# Patient Record
Sex: FEMALE | Race: OTHER | HISPANIC OR LATINO | URBAN - NONMETROPOLITAN AREA
[De-identification: names, ages, dates, MRNs, and addresses within clinical notes are randomized per-mention and may not be internally consistent; named-entity substitution may affect disease eponyms.]

---

## 2021-09-07 NOTE — PATIENT DISCUSSION
The patient feels that the cataract is significantly impacting daily activities and has elected cataract surgery. The risks, benefits, and alternatives to surgery were discussed. The patient elects to proceed with surgery.  5 [facility].

## 2021-09-14 NOTE — PATIENT DISCUSSION
Per Thomas Deborah enhancement if needed after sx on top of lasik flap provided that the post-op refractive error is not very myopic.

## 2021-09-14 NOTE — PATIENT DISCUSSION
Marleen Bond will be pulled and sent to Dr. Ketan Mann to see if pt would be a candidate for Lasik after sx. Pt very interested in Custom monovision OS regina/OD near. Pt educated in length if she is not a candidate for lasik enhancement, she may still need glasses for her best corrected vision distance, intermediate and/or near even with the Custom lens. Additonally, pt educated that there is more uncertainty prior to surgery due to irregular cornea.

## 2021-09-14 NOTE — PATIENT DISCUSSION
Rick Din will be pulled and sent to Dr. Sherryle Hum to see if pt would be a candidate for Lasik after sx. Pt very interested in Custom monovision OS regina/OD near. Pt educated in length if she is not a candidate for lasik enhancement, she may still need glasses for her best corrected vision distance, intermediate and/or near even with the Custom lens. Additonally, pt educated that there is more uncertainty prior to surgery due to irregular cornea.

## 2021-09-14 NOTE — PATIENT DISCUSSION
Per Ruben Lente enhancement if needed after sx on top of lasik flap provided that the post-op refractive error is not very myopic.

## 2021-11-16 NOTE — PATIENT DISCUSSION
Per Robby Nissen enhancement if needed after sx on top of lasik flap provided that the post-op refractive error is not very myopic.

## 2021-11-16 NOTE — PATIENT DISCUSSION
Rosalia Mcginnis will be pulled and sent to Dr. Ni Ruano to see if pt would be a candidate for Lasik after sx. Pt very interested in Custom monovision OS regina/OD near. Pt educated in length if she is not a candidate for lasik enhancement, she may still need glasses for her best corrected vision distance, intermediate and/or near even with the Custom lens. Additonally, pt educated that there is more uncertainty prior to surgery due to irregular cornea.

## 2021-11-16 NOTE — PATIENT DISCUSSION
Daryle Hatchet will be pulled and sent to Dr. Steff Kohli to see if pt would be a candidate for Lasik after sx. Pt very interested in Custom monovision OS regina/OD near. Pt educated in length if she is not a candidate for lasik enhancement, she may still need glasses for her best corrected vision distance, intermediate and/or near even with the Custom lens. Additonally, pt educated that there is more uncertainty prior to surgery due to irregular cornea.

## 2021-11-30 NOTE — PATIENT DISCUSSION
Per Cj Gary enhancement if needed after sx on top of lasik flap provided that the post-op refractive error is not very myopic.

## 2021-11-30 NOTE — PATIENT DISCUSSION
Per Herberth Milo enhancement if needed after sx on top of lasik flap provided that the post-op refractive error is not very myopic.

## 2021-11-30 NOTE — PATIENT DISCUSSION
Cinthia Mckeon will be pulled and sent to Dr. Bev Hardy to see if pt would be a candidate for Lasik after sx. Pt very interested in Custom monovision OS regina/OD near. Pt educated in length if she is not a candidate for lasik enhancement, she may still need glasses for her best corrected vision distance, intermediate and/or near even with the Custom lens. Additonally, pt educated that there is more uncertainty prior to surgery due to irregular cornea.

## 2021-11-30 NOTE — PATIENT DISCUSSION
Rafat Santillan will be pulled and sent to Dr. Zari Kent to see if pt would be a candidate for Lasik after sx. Pt very interested in Custom monovision OS regina/OD near. Pt educated in length if she is not a candidate for lasik enhancement, she may still need glasses for her best corrected vision distance, intermediate and/or near even with the Custom lens. Additonally, pt educated that there is more uncertainty prior to surgery due to irregular cornea.

## 2021-11-30 NOTE — PATIENT DISCUSSION
Patient made aware of 24/7 emergency services. Reason For Visit  Reason For Visit:   Patient presents for follow-up .   Chaperone: DEMARCO AVERY is unaccompanied.   :  services not used.        Progress Note  Progress Note:   Session Type: Individual   Date: 11/29/17   Start Time: 2:30, pm   End Time: 3:35, pm   Mood: Euthymic.   Recent Behavior: No Problem Indicated.   Symptom Change: Improved.   Diagnoses: Major depressive disorder single episode with anxious distress/ PTSD   Mental Status: No Problem Indicated   Risk of Harm: Low   Suicide/Homicide: None   Self-Injury: None   Abuse (Phys,Sex,Emot): None   Type of Treatment: Cognitive Behavioral Therapy, Motivation Enhancement and Supportive Therapy.   Session Summary: Patient reported having issues in current relationship and ending relationship the day prior. Patient reported using coping skills of journaling and feeling like that has been assisting in not lashing out. Patient has been practicing breathing. Patient reported communicating clearly with partner when ending relationship. Patient explored character traits she looks for in a partner. Patient reported feeling like she has been more positive. Patient has been spending more time with sister and mother. Patient reported better sleep and not being disturbed by break up. Patient identified feelings behind break up.   Plan: Continue with weekly counseling at this time.      Active Problems   1. Gastroesophageal reflux disease without esophagitis (K21.9)   2. Numbness of arm (R20.0)   3. Pre-conception counseling (Z31.69)    Physical Exam  Psychiatric:. the patient's orientation, memory, attention, language and fund of knowledge were normal. mood and affect were appropriate. the speech was normal. the patient's thought processes were normal. associations were intact with no tangential or circumstantial thinking. no hallucinations or delusions and no homicidal or suicidal thoughts . insight and judgment were intact.       Discussion/Summary  Discussion Summary: Additional Therapeutic Support Provided:  Engaged/allied with patient  Provided support and encouragement  Provided empathy and active listening  Provided supportive feedback and recommendations    Diagnosis:  Major depressive disorder single episode with anxious distress  Post traumatic stress disorder    Recommendations/Plan:  1. I will continue to follow patient regularly to provide training in development of better coping strategies in relation to patient's issues of mood.   2. Follow up in 2 weeks.  3. Patient will go to the nearest emergency room or call 9-1-1 if patient ever reports feelings of suicidality or if they believe patient may be a threat to self or others.  4. Continue medications under care of Dr.      Time  Consult Counseling: Total face to face time spent with patient 65 minutes. Greater than 50% of the total time was spent counseling and/or coordinating care.      Signatures   Electronically signed by : LEIGHANN LEIGH LCSW; Nov 29 2017  4:21PM CST

## 2021-11-30 NOTE — PATIENT DISCUSSION
[facility]   [place] EXT (4VIR): 8677   Operative Information hydro/ureteral stone  Date of Procedure: 3/3/20  Pre-Op Diagnosis: R hydro and renal stone  Post-Op Diagnosis: same  Procedure: R perc neph   Indication: see above  : maryanne  Anesthesia: moderate  Medications: versed and fentanyl  Description of Procedure: R perc neph 8 [place]  Specimens: too lab  Complications: none  Estimated blood loss: minimal  Patient Condition: good

## 2021-11-30 NOTE — PATIENT DISCUSSION
John Giron will be pulled and sent to Dr. Efren Spring to see if pt would be a candidate for Lasik after sx. Pt very interested in Custom monovision OS regina/OD near. Pt educated in length if she is not a candidate for lasik enhancement, she may still need glasses for her best corrected vision distance, intermediate and/or near even with the Custom lens. Additonally, pt educated that there is more uncertainty prior to surgery due to irregular cornea.

## 2021-12-01 NOTE — PATIENT DISCUSSION
Per Laura Miramontes enhancement if needed after sx on top of lasik flap provided that the post-op refractive error is not very myopic.

## 2021-12-01 NOTE — PATIENT DISCUSSION
Per Raleigh Carias enhancement if needed after sx on top of lasik flap provided that the post-op refractive error is not very myopic.

## 2021-12-01 NOTE — PATIENT DISCUSSION
Raphael Nieves will be pulled and sent to Dr. Camilla White to see if pt would be a candidate for Lasik after sx. Pt very interested in Custom monovision OS regina/OD near. Pt educated in length if she is not a candidate for lasik enhancement, she may still need glasses for her best corrected vision distance, intermediate and/or near even with the Custom lens. Additonally, pt educated that there is more uncertainty prior to surgery due to irregular cornea.

## 2021-12-01 NOTE — PATIENT DISCUSSION
Karlene Michel will be pulled and sent to Dr. Devendra Cruz to see if pt would be a candidate for Lasik after sx. Pt very interested in Custom monovision OS regina/OD near. Pt educated in length if she is not a candidate for lasik enhancement, she may still need glasses for her best corrected vision distance, intermediate and/or near even with the Custom lens. Additonally, pt educated that there is more uncertainty prior to surgery due to irregular cornea.

## 2021-12-13 NOTE — PATIENT DISCUSSION
Pippa Nick will be pulled and sent to Dr. [facility] to see if pt would be a candidate for Lasik after sx. Pt very interested in Custom monovision OS regina/OD near. Pt educated in length if she is not a candidate for lasik enhancement, she may still need glasses for her best corrected vision distance, intermediate and/or near even with the Custom lens. Additonally, pt educated that there is more uncertainty prior to surgery due to irregular cornea.

## 2021-12-13 NOTE — PATIENT DISCUSSION
Anish Tobar enhancement if needed after sx on top of lasik flap provided that the post-op refractive error is not very myopic.

## 2021-12-13 NOTE — PATIENT DISCUSSION
Annelise Wild will be pulled and sent to Dr. Valentina Lee to see if pt would be a candidate for Lasik after sx. Pt very interested in Custom monovision OS regina/OD near. Pt educated in length if she is not a candidate for lasik enhancement, she may still need glasses for her best corrected vision distance, intermediate and/or near even with the Custom lens. Additonally, pt educated that there is more uncertainty prior to surgery due to irregular cornea.

## 2021-12-13 NOTE — PATIENT DISCUSSION
Per Reji Tramaine enhancement if needed after sx on top of lasik flap provided that the post-op refractive error is not very myopic.

## 2021-12-13 NOTE — PATIENT DISCUSSION
Per Abeba De La Torre enhancement if needed after sx on top of lasik flap provided that the post-op refractive error is not very myopic.

## 2021-12-13 NOTE — PATIENT DISCUSSION
Chip Books will be pulled and sent to Dr. Ada Laurent to see if pt would be a candidate for Lasik after sx. Pt very interested in Custom monovision OS regina/OD near. Pt educated in length if she is not a candidate for lasik enhancement, she may still need glasses for her best corrected vision distance, intermediate and/or near even with the Custom lens. Additonally, pt educated that there is more uncertainty prior to surgery due to irregular cornea.

## 2021-12-13 NOTE — PATIENT DISCUSSION
Krystle Mcguire will be pulled and sent to Dr. Darell Beavers to see if pt would be a candidate for Lasik after sx. Pt very interested in Custom monovision OS regina/OD near. Pt educated in length if she is not a candidate for lasik enhancement, she may still need glasses for her best corrected vision distance, intermediate and/or near even with the Custom lens. Additonally, pt educated that there is more uncertainty prior to surgery due to irregular cornea.

## 2021-12-13 NOTE — PATIENT DISCUSSION
Per Yuliana Kohli enhancement if needed after sx on top of lasik flap provided that the post-op refractive error is not very myopic.

## 2021-12-14 NOTE — PATIENT DISCUSSION
Per Mari Pino enhancement if needed after sx on top of lasik flap provided that the post-op refractive error is not very myopic.

## 2021-12-14 NOTE — PATIENT DISCUSSION
Rashaun Garcia will be pulled and sent to Dr. Sasha Godfrey to see if pt would be a candidate for Lasik after sx. Pt very interested in Custom monovision OS regina/OD near. Pt educated in length if she is not a candidate for lasik enhancement, she may still need glasses for her best corrected vision distance, intermediate and/or near even with the Custom lens. Additonally, pt educated that there is more uncertainty prior to surgery due to irregular cornea.

## 2022-01-13 NOTE — PATIENT DISCUSSION
Per Campos Fischer enhancement if needed after sx on top of lasik flap provided that the post-op refractive error is not very myopic.

## 2022-01-13 NOTE — PATIENT DISCUSSION
Soham Adams will be pulled and sent to Dr. Jose Chavira to see if pt would be a candidate for Lasik after sx. Pt very interested in Custom monovision OS regina/OD near. Pt educated in length if she is not a candidate for lasik enhancement, she may still need glasses for her best corrected vision distance, intermediate and/or near even with the Custom lens. Additonally, pt educated that there is more uncertainty prior to surgery due to irregular cornea.

## 2022-03-09 NOTE — PATIENT DISCUSSION
Per Natalie Tatum enhancement if needed after sx on top of lasik flap provided that the post-op refractive error is not very myopic.

## 2022-03-09 NOTE — PATIENT DISCUSSION
Explained need for incision and drainage of chronic inflammation. Risks and benefits discussed and patient desires to proceed. REFER TO DR Sanjeev DUGAN.

## 2022-03-09 NOTE — PATIENT DISCUSSION
Candy Oliveira will be pulled and sent to Dr. Morris Davis to see if pt would be a candidate for Lasik after sx. Pt very interested in Custom monovision OS regina/OD near. Pt educated in length if she is not a candidate for lasik enhancement, she may still need glasses for her best corrected vision distance, intermediate and/or near even with the Custom lens. Additonally, pt educated that there is more uncertainty prior to surgery due to irregular cornea.

## 2022-03-10 NOTE — PATIENT DISCUSSION
Begin Maxitrol drops TID for 2 weeks, then stop, Erythromycin Bedtime only. [Nl] : Hematologic/Lymphatic

## 2022-03-10 NOTE — PATIENT DISCUSSION
Hakeem Sanon will be pulled and sent to Dr. Darya Kumar to see if pt would be a candidate for Lasik after sx. Pt very interested in Custom monovision OS regina/OD near. Pt educated in length if she is not a candidate for lasik enhancement, she may still need glasses for her best corrected vision distance, intermediate and/or near even with the Custom lens. Additonally, pt educated that there is more uncertainty prior to surgery due to irregular cornea.

## 2022-03-10 NOTE — PROCEDURE NOTE: CLINICAL
PROCEDURE NOTE: Excision Chalazion, Single Right Upper Lid. Diagnosis: Chalazion. Anesthesia: Subconjunctival Lidocaine. Prep: Betadine Scrub. Risks, benefits and alternatives were discussed. Patient desires to proceed with excision and drainage of the chalazion today. A drop of proparacaine was instilled in the involved eye. The area was prepped using an alcohol wipe. . The involved area was anesthetized using 1% lidocaine with epi. A chalazion clamp was place d on the eyelid and the lid was everted revealing the blocked gland. A 15 blade was used to make an incision into the blocked gland. A curette was used to remove the blocked oil. The wall of the sac was removed using mini Jonathon scissors. Cautery was used to achieve hemostasis. The clamp was removed and erythromycin ointment was instilled. The eye was patched which can be removed in an hour and post op instructions were given. The patient tolerated the procedure well and left in good condition. They understand to call for any concerns. Cesar Larry

## 2022-03-10 NOTE — PATIENT DISCUSSION
Per Ventura Artis enhancement if needed after sx on top of lasik flap provided that the post-op refractive error is not very myopic.

## 2022-04-08 ENCOUNTER — PREPPED CHART (OUTPATIENT)
Dept: URBAN - NONMETROPOLITAN AREA CLINIC 4 | Facility: CLINIC | Age: 37
End: 2022-04-08

## 2022-04-14 NOTE — PATIENT DISCUSSION
Per Birtha Stalling enhancement if needed after sx on top of lasik flap provided that the post-op refractive error is not very myopic.

## 2022-04-14 NOTE — PATIENT DISCUSSION
Discussed condition and exacerbating conditions/situations (e.g., dry/arid environments, overhead fans, air conditioners, side effect of medications). 5

## 2022-08-18 NOTE — PATIENT DISCUSSION
Recommended artificial tears to use: 1 drop 4x a day in both eyes.   =  RETAINE = PHARM GRADE FISH OIL.

## 2022-08-18 NOTE — PATIENT DISCUSSION
Anish Hou Maryan enhancement if needed after sx on top of lasik flap provided that the post-op refractive error is not very myopic.

## 2023-01-11 NOTE — PATIENT DISCUSSION
Per Rachna Clap enhancement if needed after sx on top of lasik flap provided that the post-op refractive error is not very myopic. Valentín Serna), Cardiovascular Disease; Internal Medicine  [address] [place] [address]  Phone: [phone]  Fax: [phone]

## 2023-01-11 NOTE — PATIENT DISCUSSION
Preeti will be pulled and sent to Dr. Annamarie Santiago to see if pt would be a candidate for Lasik after sx. Pt very interested in Custom monovision OS regina/OD near. Pt educated in length if she is not a candidate for lasik enhancement, she may still need glasses for her best corrected vision distance, intermediate and/or near even with the Custom lens. Additonally, pt educated that there is more uncertainty prior to surgery due to irregular cornea.